# Patient Record
Sex: MALE | Race: WHITE
[De-identification: names, ages, dates, MRNs, and addresses within clinical notes are randomized per-mention and may not be internally consistent; named-entity substitution may affect disease eponyms.]

---

## 2020-11-03 ENCOUNTER — HOSPITAL ENCOUNTER (OUTPATIENT)
Dept: HOSPITAL 47 - SLEEP | Age: 35
Discharge: HOME | End: 2020-11-03
Attending: INTERNAL MEDICINE
Payer: COMMERCIAL

## 2020-11-03 DIAGNOSIS — I10: ICD-10-CM

## 2020-11-03 DIAGNOSIS — Z99.89: ICD-10-CM

## 2020-11-03 DIAGNOSIS — Z87.39: ICD-10-CM

## 2020-11-03 DIAGNOSIS — Z79.899: ICD-10-CM

## 2020-11-03 DIAGNOSIS — G47.00: Primary | ICD-10-CM

## 2020-11-03 DIAGNOSIS — G40.909: ICD-10-CM

## 2020-11-03 PROCEDURE — 99201: CPT

## 2020-11-03 NOTE — CONS
CONSULTATION



CONSULTATION NOTE:



REASON FOR CONSULTATION:

Insomnia.



This is a 35-year-old male patient coming in.  His main complaint is difficulties in

initiating sleep.  This patient claims that he has been having insomnia for many years.

His main issue is sleep onset insomnia.  Once he goes to sleep, he is able to maintain

sleep and sometimes he is able to achieve up to 6-8 hours of sleep.  It seems that the

patient is a  and he is not working all the time.  He takes certain jobs with

friends.  Whenever he works and on work day he has to wake up early.  Otherwise, he can

stay up to 2 to 3 a.m. in the morning and get up around 8 a.m. During regular days

where he has to work, the patient tries to go to bed early at around 8 pm and this has

become a problem as the patient is unable to initiate sleep and he tosses and turns and

he struggles to go into sleep.  He has been having irregular sleep schedule for years.

His to to bed time ranges between 8 pm and 3 am.  He is currently living with a

girlfriend who works midnights and as such he is alone in the house.  He has snoring.

No witnessed apneas.  Denies waking up gasping for air or choking sensation.  No

restless lower extremities.  No sleepwalking or sleeptalking.  No anxiety or panic

attacks.  No nighttime heartburn, shortness of breath, chest pain.  His current [place]

score is at 4.  He is known to have seizure disorder.  The patient does not drive a

car.  His weight has been stable over the past year and there is no recent weight gain

or weight loss.  He has no history of substance abuse.  No history of alcoholism.  No

history of any recent head trauma.  He does not fall asleep while talking to other

individuals.  No sleep paralysis.  No hallucinations.  No cataplexy.



PAST MEDICAL HISTORY:

History of seizure disorder, acid reflux, migraines, restless legs syndrome and

hypertension.



PAST SURGICAL HISTORY:

Previous history of brain surgery for cyst.



DRUG ALLERGIES:

Not known.



OUTPATIENT MEDICATION:

Includes Keppra 500 mg twice a day, omeprazole 20 mg p.o. q. day, Singulair 10 mg p.o.

daily.  Nortriptyline 100 mg p.o. q. day, Requip 0.5 mg at bedtime and lisinopril 10 mg

p.o. daily, Motrin on an as-needed basis.



SOCIAL HISTORY:

The patient is a smoker.  He smokes 1 to 1/2 pack of cigarettes a day.  No history of

alcoholism.  No history of IV drugs.



FAMILY HISTORY:

Negative and noncontributory to current presentation.



REVIEW OF SYSTEMS:

A 14-point review of system was done and positive findings are mentioned in above

history of present illness.



PHYSICAL EXAMINATION:

BP is 149/89, pulse is 100, respirations 16, temperature 97.9.  Height is 5, 11, weight

is 263 and neck size 16.5 inches, saturation 99% on room air.

GENERAL APPEARANCE:  Calm, comfortable.

HEAD: Atraumatic, normocephalic.

NECK:  Supple.  No JVD.  No goiter neck masses.

LUNGS:  Clear to auscultation.

HEART:  Heart sounds are regular rate and rhythm, normal S1, S2.

ABDOMEN:  Soft, nontender, no organomegaly.

EXTREMITIES:  No edema, no cyanosis, or clubbing.

NEUROLOGIC:  The patient is awake and alert, there is no focal neurological deficit.



IMPRESSION:

1. Sleep onset insomnia, most likely related to abnormal sleep schedule and obvious

    issues with the patient's sleep hygiene.  The patient has a primary sleep disorder.

    No clear indication for an underlying obstructive sleep apnea.

2. Seizure disorder.

3. History of RLS maintained well with Requip.

4. Hypertension.



PLAN:

This patient will need to regulate his sleep schedule more effectively.  His condition

is problematic because on a day-to-day basis he has missed aligned his body clock and

sleep schedule and this has obviously resulted in a poor sleep quality to the point

where the patient is unable to fall asleep.  Over an extended period of time, this type

of malalignment can be linked to chronic health problems such as diabetes, depression,

bipolar disease, and other psychiatric disorder.  This patient has a component of

delayed sleep phase syndrome where he is trying to go to bed extremely late and wake up

late.  However, at other times he is trying to move into an earlier time and this has

become difficult and he has obviously led into some degree of sleep onset insomnia.



I gave the patient tips for resetting his sleep schedule.  I asked him to adjust his

bedtime and get up every day at around 6 o'clock in the morning with an alarm.  He

needs to be patient about this.  If he is aiming to go to bed earlier, he can gradually

scale his time back where he can go to bed around 11 p.m. and wake up at 6 o'clock in

the morning.  He does not have to take any naps even if he is feeling tired.  He was

advised not to sleep in and he needs to get up at the same time each day as mentioned.

Being consistent is a key for a functioning sleep schedule.  He will set his alarm to

wake up at 7 am in the morning.  I asked him to be strict about sticking to his sleep

schedule.  He needs to avoid exposure to light before sleep.  He needs to avoid eating

or exercising too close to the bedtime.  I also asked him to listen to some music

tonight that would set the mood and creating a relaxing bedtime during sleep.  No need

for any drugs or medication treatment at this point in time.  He will try these changes

and will let me know if he has been successful and regulating his sleep time.  Will

continue to follow.





CLYDE / CELESTINO: [id number] / Job#: [id number]